# Patient Record
Sex: MALE | Race: WHITE | NOT HISPANIC OR LATINO | URBAN - METROPOLITAN AREA
[De-identification: names, ages, dates, MRNs, and addresses within clinical notes are randomized per-mention and may not be internally consistent; named-entity substitution may affect disease eponyms.]

---

## 2020-07-30 ENCOUNTER — EMERGENCY (EMERGENCY)
Facility: HOSPITAL | Age: 35
LOS: 1 days | Discharge: HOME | End: 2020-07-30
Attending: EMERGENCY MEDICINE | Admitting: EMERGENCY MEDICINE
Payer: COMMERCIAL

## 2020-07-30 VITALS
HEART RATE: 78 BPM | TEMPERATURE: 98 F | RESPIRATION RATE: 16 BRPM | SYSTOLIC BLOOD PRESSURE: 130 MMHG | DIASTOLIC BLOOD PRESSURE: 70 MMHG | OXYGEN SATURATION: 97 %

## 2020-07-30 VITALS
DIASTOLIC BLOOD PRESSURE: 104 MMHG | TEMPERATURE: 98 F | WEIGHT: 190.04 LBS | OXYGEN SATURATION: 96 % | SYSTOLIC BLOOD PRESSURE: 114 MMHG | RESPIRATION RATE: 20 BRPM | HEIGHT: 71 IN | HEART RATE: 114 BPM

## 2020-07-30 LAB
ALBUMIN SERPL ELPH-MCNC: 4.4 G/DL — SIGNIFICANT CHANGE UP (ref 3.4–5)
ALP SERPL-CCNC: 64 U/L — SIGNIFICANT CHANGE UP (ref 40–120)
ALT FLD-CCNC: 128 U/L — HIGH (ref 12–42)
ANION GAP SERPL CALC-SCNC: 13 MMOL/L — SIGNIFICANT CHANGE UP (ref 9–16)
APPEARANCE UR: CLEAR — SIGNIFICANT CHANGE UP
AST SERPL-CCNC: 61 U/L — HIGH (ref 15–37)
BASOPHILS # BLD AUTO: 0.03 K/UL — SIGNIFICANT CHANGE UP (ref 0–0.2)
BASOPHILS NFR BLD AUTO: 0.4 % — SIGNIFICANT CHANGE UP (ref 0–2)
BILIRUB SERPL-MCNC: 0.5 MG/DL — SIGNIFICANT CHANGE UP (ref 0.2–1.2)
BILIRUB UR-MCNC: NEGATIVE — SIGNIFICANT CHANGE UP
BUN SERPL-MCNC: 16 MG/DL — SIGNIFICANT CHANGE UP (ref 7–23)
CALCIUM SERPL-MCNC: 9.3 MG/DL — SIGNIFICANT CHANGE UP (ref 8.5–10.5)
CHLORIDE SERPL-SCNC: 103 MMOL/L — SIGNIFICANT CHANGE UP (ref 96–108)
CO2 SERPL-SCNC: 25 MMOL/L — SIGNIFICANT CHANGE UP (ref 22–31)
COLOR SPEC: YELLOW — SIGNIFICANT CHANGE UP
CREAT SERPL-MCNC: 1.16 MG/DL — SIGNIFICANT CHANGE UP (ref 0.5–1.3)
DIFF PNL FLD: ABNORMAL
EOSINOPHIL # BLD AUTO: 0.09 K/UL — SIGNIFICANT CHANGE UP (ref 0–0.5)
EOSINOPHIL NFR BLD AUTO: 1.1 % — SIGNIFICANT CHANGE UP (ref 0–6)
GLUCOSE SERPL-MCNC: 95 MG/DL — SIGNIFICANT CHANGE UP (ref 70–99)
GLUCOSE UR QL: NEGATIVE — SIGNIFICANT CHANGE UP
HCT VFR BLD CALC: 46.8 % — SIGNIFICANT CHANGE UP (ref 39–50)
HGB BLD-MCNC: 17.1 G/DL — HIGH (ref 13–17)
IMM GRANULOCYTES NFR BLD AUTO: 0.4 % — SIGNIFICANT CHANGE UP (ref 0–1.5)
KETONES UR-MCNC: NEGATIVE — SIGNIFICANT CHANGE UP
LEUKOCYTE ESTERASE UR-ACNC: NEGATIVE — SIGNIFICANT CHANGE UP
LYMPHOCYTES # BLD AUTO: 2.7 K/UL — SIGNIFICANT CHANGE UP (ref 1–3.3)
LYMPHOCYTES # BLD AUTO: 31.9 % — SIGNIFICANT CHANGE UP (ref 13–44)
MCHC RBC-ENTMCNC: 32.9 PG — SIGNIFICANT CHANGE UP (ref 27–34)
MCHC RBC-ENTMCNC: 36.5 GM/DL — HIGH (ref 32–36)
MCV RBC AUTO: 90 FL — SIGNIFICANT CHANGE UP (ref 80–100)
MONOCYTES # BLD AUTO: 0.73 K/UL — SIGNIFICANT CHANGE UP (ref 0–0.9)
MONOCYTES NFR BLD AUTO: 8.6 % — SIGNIFICANT CHANGE UP (ref 2–14)
NEUTROPHILS # BLD AUTO: 4.89 K/UL — SIGNIFICANT CHANGE UP (ref 1.8–7.4)
NEUTROPHILS NFR BLD AUTO: 57.6 % — SIGNIFICANT CHANGE UP (ref 43–77)
NITRITE UR-MCNC: NEGATIVE — SIGNIFICANT CHANGE UP
NRBC # BLD: 0 /100 WBCS — SIGNIFICANT CHANGE UP (ref 0–0)
PH UR: 6 — SIGNIFICANT CHANGE UP (ref 5–8)
PLATELET # BLD AUTO: 325 K/UL — SIGNIFICANT CHANGE UP (ref 150–400)
POTASSIUM SERPL-MCNC: 3.8 MMOL/L — SIGNIFICANT CHANGE UP (ref 3.5–5.3)
POTASSIUM SERPL-SCNC: 3.8 MMOL/L — SIGNIFICANT CHANGE UP (ref 3.5–5.3)
PROT SERPL-MCNC: 8.3 G/DL — HIGH (ref 6.4–8.2)
PROT UR-MCNC: ABNORMAL MG/DL
RBC # BLD: 5.2 M/UL — SIGNIFICANT CHANGE UP (ref 4.2–5.8)
RBC # FLD: 11.6 % — SIGNIFICANT CHANGE UP (ref 10.3–14.5)
SODIUM SERPL-SCNC: 141 MMOL/L — SIGNIFICANT CHANGE UP (ref 132–145)
SP GR SPEC: 1.02 — SIGNIFICANT CHANGE UP (ref 1–1.03)
UROBILINOGEN FLD QL: 0.2 E.U./DL — SIGNIFICANT CHANGE UP
WBC # BLD: 8.47 K/UL — SIGNIFICANT CHANGE UP (ref 3.8–10.5)
WBC # FLD AUTO: 8.47 K/UL — SIGNIFICANT CHANGE UP (ref 3.8–10.5)

## 2020-07-30 PROCEDURE — 99218: CPT

## 2020-07-30 PROCEDURE — 74176 CT ABD & PELVIS W/O CONTRAST: CPT | Mod: 26

## 2020-07-30 RX ORDER — TAMSULOSIN HYDROCHLORIDE 0.4 MG/1
0.8 CAPSULE ORAL ONCE
Refills: 0 | Status: COMPLETED | OUTPATIENT
Start: 2020-07-30 | End: 2020-07-30

## 2020-07-30 RX ORDER — ONDANSETRON 8 MG/1
4 TABLET, FILM COATED ORAL ONCE
Refills: 0 | Status: COMPLETED | OUTPATIENT
Start: 2020-07-30 | End: 2020-07-30

## 2020-07-30 RX ORDER — OXYCODONE AND ACETAMINOPHEN 5; 325 MG/1; MG/1
1 TABLET ORAL ONCE
Refills: 0 | Status: DISCONTINUED | OUTPATIENT
Start: 2020-07-30 | End: 2020-07-30

## 2020-07-30 RX ORDER — MORPHINE SULFATE 50 MG/1
4 CAPSULE, EXTENDED RELEASE ORAL ONCE
Refills: 0 | Status: DISCONTINUED | OUTPATIENT
Start: 2020-07-30 | End: 2020-07-30

## 2020-07-30 RX ORDER — KETOROLAC TROMETHAMINE 30 MG/ML
30 SYRINGE (ML) INJECTION ONCE
Refills: 0 | Status: DISCONTINUED | OUTPATIENT
Start: 2020-07-30 | End: 2020-07-30

## 2020-07-30 RX ORDER — SODIUM CHLORIDE 9 MG/ML
1000 INJECTION INTRAMUSCULAR; INTRAVENOUS; SUBCUTANEOUS ONCE
Refills: 0 | Status: COMPLETED | OUTPATIENT
Start: 2020-07-30 | End: 2020-07-30

## 2020-07-30 RX ADMIN — MORPHINE SULFATE 4 MILLIGRAM(S): 50 CAPSULE, EXTENDED RELEASE ORAL at 15:17

## 2020-07-30 RX ADMIN — ONDANSETRON 4 MILLIGRAM(S): 8 TABLET, FILM COATED ORAL at 14:51

## 2020-07-30 RX ADMIN — MORPHINE SULFATE 4 MILLIGRAM(S): 50 CAPSULE, EXTENDED RELEASE ORAL at 16:03

## 2020-07-30 RX ADMIN — TAMSULOSIN HYDROCHLORIDE 0.8 MILLIGRAM(S): 0.4 CAPSULE ORAL at 14:51

## 2020-07-30 RX ADMIN — SODIUM CHLORIDE 250 MILLILITER(S): 9 INJECTION INTRAMUSCULAR; INTRAVENOUS; SUBCUTANEOUS at 17:17

## 2020-07-30 RX ADMIN — Medication 30 MILLIGRAM(S): at 15:11

## 2020-07-30 RX ADMIN — OXYCODONE AND ACETAMINOPHEN 1 TABLET(S): 5; 325 TABLET ORAL at 17:17

## 2020-07-30 RX ADMIN — Medication 30 MILLIGRAM(S): at 14:51

## 2020-07-30 NOTE — ED CDU PROVIDER DISPOSITION NOTE - CARE PROVIDERS DIRECT ADDRESSES
,DirectAddress_Unknown,[email or web address]

## 2020-07-30 NOTE — ED CDU PROVIDER DISPOSITION NOTE - PATIENT PORTAL LINK FT
You can access the FollowMyHealth Patient Portal offered by [facility] by registering at the following website: [email or web address]. By joining [facility]’s FollowMyHealth portal, you will also be able to view your health information using other applications (apps) compatible with our system.

## 2020-07-30 NOTE — ED CDU PROVIDER INITIAL DAY NOTE - OBJECTIVE STATEMENT
34 yo M w/ PMHx of HTN on losartan, Crohn's Disease, colitis, ankylosing spondylitis, kidney stone c/o right flank pain w/ associated N/V since last night. On arrival, pt is diaphoretic. Pt reports previous kidney stones in the past with the last hospitalization was 5 years ago; Pt states today's episode is similar to previous episodes in the past. Denies fever, chills, diarrhea, hematuria, dysuria, abdominal pain, dizziness, SOB, CP.

## 2020-07-30 NOTE — ED CDU PROVIDER INITIAL DAY NOTE - PROGRESS NOTE DETAILS
Spoke with Dr. Gao of urology regarding pt's case; advised pain control in ED with discharge and outpatient follow up tomorrow to schedule outpatient lithotripsy. Discussed plan with patient, pt agrees with plan. will attempt to transition to PO analgesics. pt reassessed every 1 hour for pain and is managing well, tolerating PO. will d/c with percocet and plan to see  in the morning.

## 2020-07-30 NOTE — ED PROVIDER NOTE - OBJECTIVE STATEMENT
34 yo M w/ PMHx of HTN on losartan, Crohn's Disease, colitis, ankylosing spondylitis, kidney stone last hosp 5 yrs ago  vom last night  right flank last night  losartan 36 yo M w/ PMHx of HTN on losartan, Crohn's Disease, colitis, ankylosing spondylitis, kidney stone c/o right flank pain w/ associated N/V since last night. On arrival, pt is diaphoretic. Pt reports previous kidney stones in the past with the last hospitalization was 5 years ago; Pt states today's episode is similar to previous episodes in the past. Denies fever, chills, diarrhea, hematuria, dysuria, abdominal pain, dizziness, SOB, CP.

## 2020-07-30 NOTE — ED PROVIDER NOTE - CLINICAL SUMMARY MEDICAL DECISION MAKING FREE TEXT BOX
Pt presents to the ED c/o right flank pain since last night with associated N/V. Pt w/ history of kidney stone in the past. Will check labs, urine renal stone hunt CT; IV fluids, analgesics, anti-emetics ordered for symptom relief. Will reassess.

## 2020-07-30 NOTE — ED PROVIDER NOTE - PROGRESS NOTE DETAILS
discussed case with Dr. Granado (urology) who agrees this case will need intervention but not immediately as patient has normal WBC and no UTI on UA. requests starting the patient on flomax and oral pain control. if pain is controlled, pt can be d/c and seen in clinic tomorrow morning by his colleague Dr. Cole, otherwise, if pain is not controlled, will admit.

## 2020-07-30 NOTE — ED CDU PROVIDER INITIAL DAY NOTE - MEDICAL DECISION MAKING DETAILS
pt presents with right flank pain and h/o kidney stones. CT shows 1cm kidney stone on right. labs WNL except for expected blood in urine. Cr, WBC, UA unremarkable. discussed with Dr. Granado (urology) who's partner will see pt in clinic tomorrow morning for repeat eval and discussion of lithotripsy. Rx for percocet sent.

## 2020-07-30 NOTE — ED CDU PROVIDER DISPOSITION NOTE - NSFOLLOWUPINSTRUCTIONS_ED_ALL_ED_FT
Kidney Stones    Kidney stones (urolithiasis) are crystal deposits that form inside your kidneys. Pain is caused by the stone moving through the urinary tract, causing spasms of the ureter. Drink enough water and fluids to keep your urine clear or pale yellow. This will help you to pass the stone or stone fragments. If provided a strainer, strain all urine and keep all particulate matter and stones for a follow up appointment with a urologist.    SEEK IMMEDIATE MEDICAL CARE IF YOU HAVE ANY OF THE FOLLOWING SYMPTOMS: pain not controlled with medication, fever/chills, worsening vomiting, inability to urinate, or dizziness/lightheadedness.     TAKE PERCOCET AS PRESCRIBED FOR BREAKTHROUGH PAIN. DO NOT TAKE WHEN TAKING OTHER TYLENOL AS IT ALREADY CONTAINS TYLENOL. DO NOT TAKE WHEN DRIVING OR OPERATING MACHINERY OR WHEN PROVIDING CARE AS IT CAN MAKE YOU SLEEPY AND IMPAIR YOUR JUDGEMENT. DO NOT TAKE WITH OTHER SEDATIVES INCLUDING ALCOHOL. IT CAN BE ADDICTIVE AND CONSTIPATING SO TAKE ONLY AS PRESCRIBED.     TAKE 650MG TYLENOL EVERY 6 HOURS AS NEEDED FOR PAIN.     TAKE IBUPROFEN 600MG EVERY 6 HOURS WITH FOOD AS NEEDED FOR PAIN.     STAY WELL HYDRATED.     CALL DR. MCKEON OR DR. MUÑOZ TOMORROW MORNING [phone] FOR AN APPOINTMENT TOMORROW MORNING BETWEEN 9-1.

## 2020-07-30 NOTE — ED CDU PROVIDER INITIAL DAY NOTE - ATTENDING CONTRIBUTION TO CARE
Patient presenting with R flank pain. VSS R abdo ttp, + CVAT. CTAP shows 1cm prox UVJ stone. Placed on obs.  aware.     Pain controlled. He will fu closely with .

## 2020-07-30 NOTE — ED ADULT NURSE NOTE - NSIMPLEMENTINTERV_GEN_ALL_ED
Implemented All Universal Safety Interventions:  [place] to call system. Call bell, personal items and telephone within reach. Instruct patient to call for assistance. Room bathroom lighting operational. Non-slip footwear when patient is off stretcher. Physically safe environment: no spills, clutter or unnecessary equipment. Stretcher in lowest position, wheels locked, appropriate side rails in place.

## 2020-07-30 NOTE — ED PROVIDER NOTE - MUSCULOSKELETAL, MLM
Spine appears normal, range of motion is not limited, no muscle or joint tenderness Estimated Blood Loss (Cc): minimal

## 2020-07-30 NOTE — ED PROVIDER NOTE - ATTENDING CONTRIBUTION TO CARE
Patient presenting with R flank pain. VSS R abdo ttp, + CVAT. CTAP shows 1cm prox UVJ stone. Placed on obs.  aware.

## 2020-07-31 LAB
CULTURE RESULTS: NO GROWTH — SIGNIFICANT CHANGE UP
SARS-COV-2 RNA SPEC QL NAA+PROBE: SIGNIFICANT CHANGE UP
SPECIMEN SOURCE: SIGNIFICANT CHANGE UP

## 2020-08-03 DIAGNOSIS — N20.0 CALCULUS OF KIDNEY: ICD-10-CM

## 2020-08-03 DIAGNOSIS — Z20.828 CONTACT WITH AND (SUSPECTED) EXPOSURE TO OTHER VIRAL COMMUNICABLE DISEASES: ICD-10-CM

## 2020-08-03 DIAGNOSIS — Z79.899 OTHER LONG TERM (CURRENT) DRUG THERAPY: ICD-10-CM

## 2020-08-03 DIAGNOSIS — R10.9 UNSPECIFIED ABDOMINAL PAIN: ICD-10-CM

## 2020-08-03 DIAGNOSIS — I10 ESSENTIAL (PRIMARY) HYPERTENSION: ICD-10-CM

## 2023-06-08 NOTE — ED ADULT NURSE NOTE - HOW OFTEN DO YOU HAVE A DRINK CONTAINING ALCOHOL?
time.   Do not stop taking them without consulting your healthcare provider. Don't share them with anyone else. Know what effects and side effects to expect, and report them to your healthcare provider. If you are taking more than one drug, even if it is an over-the-counter medication, herb, or dietary supplement, be sure to check with a physician or pharmacist about drug interactions. Plan ahead for refills so you don't run out. Lifestyle Changes - The results of your colonoscopy will determine if any lifestyle changes are necessary. Follow-up:  The doctor will usually give you a preliminary report after the medication wears off and you are more alert. The results from a biopsy can take as long as 1-2 weeks to be completed. Schedule a follow-up appointment as directed by your doctor. You should schedule a follow-up colonoscopy as recommended by your doctor. Call Your Doctor If Any of the Following Occurs:  Bleeding from your rectum; notify your doctor if you pass a teaspoonful or more of blood   Black, tarry stools   Severe abdominal pain   Hard, swollen abdomen   Signs of infection, including fever or chills   Inability to pass gas or stool   Coughing, shortness of breath, chest pain, severe nausea or vomiting     In case of an emergency, call 911 immediately. Await pathology. Eulogio Mcclelland MD    With questions or concerns, call  [unfilled] at [phone]. Monthly or less